# Patient Record
Sex: FEMALE | Race: OTHER | NOT HISPANIC OR LATINO | URBAN - METROPOLITAN AREA
[De-identification: names, ages, dates, MRNs, and addresses within clinical notes are randomized per-mention and may not be internally consistent; named-entity substitution may affect disease eponyms.]

---

## 2017-01-22 ENCOUNTER — EMERGENCY (EMERGENCY)
Facility: HOSPITAL | Age: 32
LOS: 1 days | Discharge: HOME | End: 2017-01-22
Attending: EMERGENCY MEDICINE
Payer: MEDICAID

## 2017-01-22 VITALS
SYSTOLIC BLOOD PRESSURE: 140 MMHG | RESPIRATION RATE: 20 BRPM | OXYGEN SATURATION: 100 % | HEART RATE: 93 BPM | DIASTOLIC BLOOD PRESSURE: 81 MMHG | WEIGHT: 199.96 LBS | TEMPERATURE: 99 F

## 2017-01-22 DIAGNOSIS — R50.9 FEVER, UNSPECIFIED: ICD-10-CM

## 2017-01-22 DIAGNOSIS — J03.90 ACUTE TONSILLITIS, UNSPECIFIED: ICD-10-CM

## 2017-01-22 PROCEDURE — 99283 EMERGENCY DEPT VISIT LOW MDM: CPT

## 2017-01-22 RX ORDER — PENICILLIN V POTASSIUM 250 MG
500 TABLET ORAL ONCE
Qty: 0 | Refills: 0 | Status: COMPLETED | OUTPATIENT
Start: 2017-01-22 | End: 2017-01-22

## 2017-01-22 RX ORDER — PENICILLIN V POTASSIUM 250 MG
1 TABLET ORAL
Qty: 30 | Refills: 0
Start: 2017-01-22 | End: 2017-02-01

## 2017-01-22 RX ORDER — ACETAMINOPHEN 500 MG
650 TABLET ORAL ONCE
Qty: 0 | Refills: 0 | Status: COMPLETED | OUTPATIENT
Start: 2017-01-22 | End: 2017-01-22

## 2017-01-22 RX ADMIN — Medication 500 MILLIGRAM(S): at 20:41

## 2017-01-22 RX ADMIN — Medication 650 MILLIGRAM(S): at 20:40

## 2017-01-22 NOTE — ED STATDOCS - OBJECTIVE STATEMENT
30 y/o F pt w/ no significant PMHx presents to the ED c/o subjective fever and body aches onset today morning. Pt states that she woke up w/ her sx, and that her daughter has similar sx. Pt's son has strep pharyngitis. Pt denies cough, vomiting, throat pain, nasal congestion, chance of pregnancy, or any other complaints. NKDA.

## 2017-01-22 NOTE — ED STATDOCS - NS ED MD SCRIBE ATTENDING SCRIBE SECTIONS
PAST MEDICAL/SURGICAL/SOCIAL HISTORY/VITAL SIGNS( Pullset)/HIV/DISPOSITION/REVIEW OF SYSTEMS/PHYSICAL EXAM/HISTORY OF PRESENT ILLNESS

## 2017-01-22 NOTE — ED STATDOCS - MEDICAL DECISION MAKING DETAILS
32 y/o F pt w/ fever. Pt's child dx w/ strep throat by PMD 2 days ago, mothers other child sick w/ fever and slight watery eyes. Will tx family for probable strep.

## 2017-01-22 NOTE — ED STATDOCS - DETAILS:
I, Dana Maurer, personally performed the services described in the documentation, reviewed the documentation recorded by the scribe in my presence and it accurately and completely records my words and action.

## 2017-03-05 ENCOUNTER — TRANSCRIPTION ENCOUNTER (OUTPATIENT)
Age: 32
End: 2017-03-05

## 2017-05-11 ENCOUNTER — APPOINTMENT (OUTPATIENT)
Dept: PLASTIC SURGERY | Facility: CLINIC | Age: 32
End: 2017-05-11

## 2018-12-12 ENCOUNTER — TRANSCRIPTION ENCOUNTER (OUTPATIENT)
Age: 33
End: 2018-12-12

## 2019-10-30 ENCOUNTER — OUTPATIENT (OUTPATIENT)
Dept: OUTPATIENT SERVICES | Facility: HOSPITAL | Age: 34
LOS: 1 days | End: 2019-10-30

## 2019-10-30 VITALS
DIASTOLIC BLOOD PRESSURE: 86 MMHG | HEART RATE: 76 BPM | TEMPERATURE: 98 F | SYSTOLIC BLOOD PRESSURE: 120 MMHG | WEIGHT: 229.28 LBS | RESPIRATION RATE: 20 BRPM | HEIGHT: 64 IN

## 2019-10-30 DIAGNOSIS — Z90.49 ACQUIRED ABSENCE OF OTHER SPECIFIED PARTS OF DIGESTIVE TRACT: Chronic | ICD-10-CM

## 2019-10-30 DIAGNOSIS — Z01.818 ENCOUNTER FOR OTHER PREPROCEDURAL EXAMINATION: ICD-10-CM

## 2019-10-30 DIAGNOSIS — Z98.84 BARIATRIC SURGERY STATUS: Chronic | ICD-10-CM

## 2019-10-30 LAB
APTT BLD: 27.1 SEC — LOW (ref 27.5–36.3)
BASOPHILS # BLD AUTO: 0.03 K/UL — SIGNIFICANT CHANGE UP (ref 0–0.2)
BASOPHILS NFR BLD AUTO: 0.4 % — SIGNIFICANT CHANGE UP (ref 0–2)
BLD GP AB SCN SERPL QL: SIGNIFICANT CHANGE UP
EOSINOPHIL # BLD AUTO: 0.12 K/UL — SIGNIFICANT CHANGE UP (ref 0–0.5)
EOSINOPHIL NFR BLD AUTO: 1.7 % — SIGNIFICANT CHANGE UP (ref 0–6)
HCT VFR BLD CALC: 29.4 % — LOW (ref 34.5–45)
HGB BLD-MCNC: 8.9 G/DL — LOW (ref 11.5–15.5)
HIV 1 & 2 AB SERPL IA.RAPID: SIGNIFICANT CHANGE UP
HIV 1+2 AB+HIV1 P24 AG SERPL QL IA: SIGNIFICANT CHANGE UP
IMM GRANULOCYTES NFR BLD AUTO: 1.1 % — SIGNIFICANT CHANGE UP (ref 0–1.5)
INR BLD: 0.97 RATIO — SIGNIFICANT CHANGE UP (ref 0.88–1.16)
LYMPHOCYTES # BLD AUTO: 2.33 K/UL — SIGNIFICANT CHANGE UP (ref 1–3.3)
LYMPHOCYTES # BLD AUTO: 32.7 % — SIGNIFICANT CHANGE UP (ref 13–44)
MCHC RBC-ENTMCNC: 24.1 PG — LOW (ref 27–34)
MCHC RBC-ENTMCNC: 30.3 GM/DL — LOW (ref 32–36)
MCV RBC AUTO: 79.5 FL — LOW (ref 80–100)
MONOCYTES # BLD AUTO: 0.51 K/UL — SIGNIFICANT CHANGE UP (ref 0–0.9)
MONOCYTES NFR BLD AUTO: 7.2 % — SIGNIFICANT CHANGE UP (ref 2–14)
NEUTROPHILS # BLD AUTO: 4.05 K/UL — SIGNIFICANT CHANGE UP (ref 1.8–7.4)
NEUTROPHILS NFR BLD AUTO: 56.9 % — SIGNIFICANT CHANGE UP (ref 43–77)
PLATELET # BLD AUTO: 257 K/UL — SIGNIFICANT CHANGE UP (ref 150–400)
PROTHROM AB SERPL-ACNC: 11.2 SEC — SIGNIFICANT CHANGE UP (ref 10–12.9)
RBC # BLD: 3.7 M/UL — LOW (ref 3.8–5.2)
RBC # FLD: 14.6 % — HIGH (ref 10.3–14.5)
WBC # BLD: 7.12 K/UL — SIGNIFICANT CHANGE UP (ref 3.8–10.5)
WBC # FLD AUTO: 7.12 K/UL — SIGNIFICANT CHANGE UP (ref 3.8–10.5)

## 2019-10-30 NOTE — OB PST NOTE - PMH
Acute gastric ulcer, unspecified whether gastric ulcer hemorrhage or perforation present  pt states she was vomiting blood 3 days ago and MD prescribed Omeprezole  No pertinent past medical history     (normal spontaneous vaginal delivery)

## 2019-10-30 NOTE — OB PST NOTE - PRO PATERNAL INTER NEED
Med: Methotrexate 2.5mg   Tabs requested: 78  Si/week  Refills Requested: 2    Last time filled: 2017 Pt wants 3 month supply  Last office Visit: 17  Dx: rheumatoid arthritis and senile osteoporosis  F/u: RTC 1 year   last lab: 17    refillwithin protocol  Script e-scribed to patients preferred pharmacy.         English

## 2019-11-07 ENCOUNTER — RESULT REVIEW (OUTPATIENT)
Age: 34
End: 2019-11-07

## 2019-11-07 ENCOUNTER — INPATIENT (INPATIENT)
Facility: HOSPITAL | Age: 34
LOS: 2 days | Discharge: HOME | End: 2019-11-10
Attending: OBSTETRICS & GYNECOLOGY | Admitting: OBSTETRICS & GYNECOLOGY
Payer: MEDICAID

## 2019-11-07 VITALS
RESPIRATION RATE: 16 BRPM | DIASTOLIC BLOOD PRESSURE: 60 MMHG | HEIGHT: 64 IN | SYSTOLIC BLOOD PRESSURE: 113 MMHG | TEMPERATURE: 98 F | WEIGHT: 229.28 LBS | HEART RATE: 74 BPM

## 2019-11-07 DIAGNOSIS — Z98.84 BARIATRIC SURGERY STATUS: Chronic | ICD-10-CM

## 2019-11-07 DIAGNOSIS — O34.219 MATERNAL CARE FOR UNSPECIFIED TYPE SCAR FROM PREVIOUS CESAREAN DELIVERY: ICD-10-CM

## 2019-11-07 DIAGNOSIS — Z90.49 ACQUIRED ABSENCE OF OTHER SPECIFIED PARTS OF DIGESTIVE TRACT: Chronic | ICD-10-CM

## 2019-11-07 LAB
APPEARANCE UR: CLEAR — SIGNIFICANT CHANGE UP
BACTERIA # UR AUTO: ABNORMAL
BASOPHILS # BLD AUTO: 0.04 K/UL — SIGNIFICANT CHANGE UP (ref 0–0.2)
BASOPHILS NFR BLD AUTO: 0.5 % — SIGNIFICANT CHANGE UP (ref 0–2)
BILIRUB UR-MCNC: NEGATIVE — SIGNIFICANT CHANGE UP
BLD GP AB SCN SERPL QL: SIGNIFICANT CHANGE UP
COLOR SPEC: YELLOW — SIGNIFICANT CHANGE UP
COMMENT - URINE: SIGNIFICANT CHANGE UP
DIFF PNL FLD: NEGATIVE — SIGNIFICANT CHANGE UP
EOSINOPHIL # BLD AUTO: 0.1 K/UL — SIGNIFICANT CHANGE UP (ref 0–0.5)
EOSINOPHIL NFR BLD AUTO: 1.1 % — SIGNIFICANT CHANGE UP (ref 0–6)
EPI CELLS # UR: SIGNIFICANT CHANGE UP
GLUCOSE UR QL: NEGATIVE MG/DL — SIGNIFICANT CHANGE UP
HCT VFR BLD CALC: 32.8 % — LOW (ref 34.5–45)
HGB BLD-MCNC: 10.1 G/DL — LOW (ref 11.5–15.5)
IMM GRANULOCYTES NFR BLD AUTO: 0.9 % — SIGNIFICANT CHANGE UP (ref 0–1.5)
KETONES UR-MCNC: ABNORMAL
LEUKOCYTE ESTERASE UR-ACNC: ABNORMAL
LYMPHOCYTES # BLD AUTO: 2.96 K/UL — SIGNIFICANT CHANGE UP (ref 1–3.3)
LYMPHOCYTES # BLD AUTO: 33.8 % — SIGNIFICANT CHANGE UP (ref 13–44)
MCHC RBC-ENTMCNC: 24.1 PG — LOW (ref 27–34)
MCHC RBC-ENTMCNC: 30.8 GM/DL — LOW (ref 32–36)
MCV RBC AUTO: 78.3 FL — LOW (ref 80–100)
MONOCYTES # BLD AUTO: 0.6 K/UL — SIGNIFICANT CHANGE UP (ref 0–0.9)
MONOCYTES NFR BLD AUTO: 6.9 % — SIGNIFICANT CHANGE UP (ref 2–14)
NEUTROPHILS # BLD AUTO: 4.97 K/UL — SIGNIFICANT CHANGE UP (ref 1.8–7.4)
NEUTROPHILS NFR BLD AUTO: 56.8 % — SIGNIFICANT CHANGE UP (ref 43–77)
NITRITE UR-MCNC: NEGATIVE — SIGNIFICANT CHANGE UP
PH UR: 7 — SIGNIFICANT CHANGE UP (ref 5–8)
PLATELET # BLD AUTO: 291 K/UL — SIGNIFICANT CHANGE UP (ref 150–400)
PROT UR-MCNC: NEGATIVE MG/DL — SIGNIFICANT CHANGE UP
RBC # BLD: 4.19 M/UL — SIGNIFICANT CHANGE UP (ref 3.8–5.2)
RBC # FLD: 15.1 % — HIGH (ref 10.3–14.5)
RBC CASTS # UR COMP ASSIST: SIGNIFICANT CHANGE UP /HPF (ref 0–4)
SP GR SPEC: 1.01 — SIGNIFICANT CHANGE UP (ref 1.01–1.02)
T PALLIDUM AB TITR SER: NEGATIVE — SIGNIFICANT CHANGE UP
UROBILINOGEN FLD QL: NEGATIVE MG/DL — SIGNIFICANT CHANGE UP
WBC # BLD: 8.75 K/UL — SIGNIFICANT CHANGE UP (ref 3.8–10.5)
WBC # FLD AUTO: 8.75 K/UL — SIGNIFICANT CHANGE UP (ref 3.8–10.5)
WBC UR QL: SIGNIFICANT CHANGE UP

## 2019-11-07 PROCEDURE — 88302 TISSUE EXAM BY PATHOLOGIST: CPT | Mod: 26

## 2019-11-07 RX ORDER — ACETAMINOPHEN 500 MG
975 TABLET ORAL
Refills: 0 | Status: DISCONTINUED | OUTPATIENT
Start: 2019-11-07 | End: 2019-11-10

## 2019-11-07 RX ORDER — FENTANYL CITRATE 50 UG/ML
25 INJECTION INTRAVENOUS
Refills: 0 | Status: DISCONTINUED | OUTPATIENT
Start: 2019-11-07 | End: 2019-11-07

## 2019-11-07 RX ORDER — SODIUM CHLORIDE 9 MG/ML
1000 INJECTION, SOLUTION INTRAVENOUS
Refills: 0 | Status: DISCONTINUED | OUTPATIENT
Start: 2019-11-07 | End: 2019-11-07

## 2019-11-07 RX ORDER — AMPICILLIN TRIHYDRATE 250 MG
1 CAPSULE ORAL EVERY 4 HOURS
Refills: 0 | Status: DISCONTINUED | OUTPATIENT
Start: 2019-11-07 | End: 2019-11-08

## 2019-11-07 RX ORDER — OXYTOCIN 10 UNIT/ML
333.33 VIAL (ML) INJECTION
Qty: 20 | Refills: 0 | Status: DISCONTINUED | OUTPATIENT
Start: 2019-11-07 | End: 2019-11-10

## 2019-11-07 RX ORDER — METOCLOPRAMIDE HCL 10 MG
10 TABLET ORAL ONCE
Refills: 0 | Status: DISCONTINUED | OUTPATIENT
Start: 2019-11-07 | End: 2019-11-07

## 2019-11-07 RX ORDER — GLYCERIN ADULT
1 SUPPOSITORY, RECTAL RECTAL AT BEDTIME
Refills: 0 | Status: DISCONTINUED | OUTPATIENT
Start: 2019-11-07 | End: 2019-11-10

## 2019-11-07 RX ORDER — ACETAMINOPHEN 500 MG
1000 TABLET ORAL ONCE
Refills: 0 | Status: DISCONTINUED | OUTPATIENT
Start: 2019-11-07 | End: 2019-11-07

## 2019-11-07 RX ORDER — TETANUS TOXOID, REDUCED DIPHTHERIA TOXOID AND ACELLULAR PERTUSSIS VACCINE, ADSORBED 5; 2.5; 8; 8; 2.5 [IU]/.5ML; [IU]/.5ML; UG/.5ML; UG/.5ML; UG/.5ML
0.5 SUSPENSION INTRAMUSCULAR ONCE
Refills: 0 | Status: DISCONTINUED | OUTPATIENT
Start: 2019-11-07 | End: 2019-11-10

## 2019-11-07 RX ORDER — IBUPROFEN 200 MG
600 TABLET ORAL EVERY 6 HOURS
Refills: 0 | Status: COMPLETED | OUTPATIENT
Start: 2019-11-07 | End: 2020-10-05

## 2019-11-07 RX ORDER — DIPHENHYDRAMINE HCL 50 MG
25 CAPSULE ORAL EVERY 6 HOURS
Refills: 0 | Status: DISCONTINUED | OUTPATIENT
Start: 2019-11-07 | End: 2019-11-10

## 2019-11-07 RX ORDER — KETOROLAC TROMETHAMINE 30 MG/ML
30 SYRINGE (ML) INJECTION ONCE
Refills: 0 | Status: DISCONTINUED | OUTPATIENT
Start: 2019-11-07 | End: 2019-11-07

## 2019-11-07 RX ORDER — MAGNESIUM HYDROXIDE 400 MG/1
30 TABLET, CHEWABLE ORAL
Refills: 0 | Status: DISCONTINUED | OUTPATIENT
Start: 2019-11-07 | End: 2019-11-10

## 2019-11-07 RX ORDER — OXYTOCIN 10 UNIT/ML
333.33 VIAL (ML) INJECTION
Qty: 20 | Refills: 0 | Status: DISCONTINUED | OUTPATIENT
Start: 2019-11-07 | End: 2019-11-07

## 2019-11-07 RX ORDER — CITRIC ACID/SODIUM CITRATE 300-500 MG
30 SOLUTION, ORAL ORAL ONCE
Refills: 0 | Status: COMPLETED | OUTPATIENT
Start: 2019-11-07 | End: 2019-11-07

## 2019-11-07 RX ORDER — AMPICILLIN TRIHYDRATE 250 MG
2 CAPSULE ORAL ONCE
Refills: 0 | Status: COMPLETED | OUTPATIENT
Start: 2019-11-07 | End: 2019-11-07

## 2019-11-07 RX ORDER — CEFAZOLIN SODIUM 1 G
2000 VIAL (EA) INJECTION ONCE
Refills: 0 | Status: COMPLETED | OUTPATIENT
Start: 2019-11-07 | End: 2019-11-07

## 2019-11-07 RX ORDER — SODIUM CHLORIDE 9 MG/ML
1000 INJECTION, SOLUTION INTRAVENOUS
Refills: 0 | Status: DISCONTINUED | OUTPATIENT
Start: 2019-11-07 | End: 2019-11-10

## 2019-11-07 RX ORDER — LANOLIN
1 OINTMENT (GRAM) TOPICAL EVERY 6 HOURS
Refills: 0 | Status: DISCONTINUED | OUTPATIENT
Start: 2019-11-07 | End: 2019-11-10

## 2019-11-07 RX ORDER — DEXAMETHASONE 0.5 MG/5ML
8 ELIXIR ORAL ONCE
Refills: 0 | Status: DISCONTINUED | OUTPATIENT
Start: 2019-11-07 | End: 2019-11-07

## 2019-11-07 RX ORDER — ONDANSETRON 8 MG/1
4 TABLET, FILM COATED ORAL ONCE
Refills: 0 | Status: DISCONTINUED | OUTPATIENT
Start: 2019-11-07 | End: 2019-11-07

## 2019-11-07 RX ORDER — SODIUM CHLORIDE 9 MG/ML
1000 INJECTION, SOLUTION INTRAVENOUS ONCE
Refills: 0 | Status: COMPLETED | OUTPATIENT
Start: 2019-11-07 | End: 2019-11-07

## 2019-11-07 RX ORDER — FAMOTIDINE 10 MG/ML
20 INJECTION INTRAVENOUS ONCE
Refills: 0 | Status: COMPLETED | OUTPATIENT
Start: 2019-11-07 | End: 2019-11-07

## 2019-11-07 RX ORDER — SIMETHICONE 80 MG/1
80 TABLET, CHEWABLE ORAL EVERY 4 HOURS
Refills: 0 | Status: DISCONTINUED | OUTPATIENT
Start: 2019-11-07 | End: 2019-11-10

## 2019-11-07 RX ORDER — KETOROLAC TROMETHAMINE 30 MG/ML
30 SYRINGE (ML) INJECTION EVERY 6 HOURS
Refills: 0 | Status: DISCONTINUED | OUTPATIENT
Start: 2019-11-07 | End: 2019-11-08

## 2019-11-07 RX ADMIN — Medication 975 MILLIGRAM(S): at 21:42

## 2019-11-07 RX ADMIN — Medication 30 MILLIGRAM(S): at 11:58

## 2019-11-07 RX ADMIN — Medication 30 MILLILITER(S): at 06:43

## 2019-11-07 RX ADMIN — Medication 30 MILLIGRAM(S): at 11:43

## 2019-11-07 RX ADMIN — Medication 975 MILLIGRAM(S): at 22:42

## 2019-11-07 RX ADMIN — Medication 1000 MILLIUNIT(S)/MIN: at 08:25

## 2019-11-07 RX ADMIN — FAMOTIDINE 20 MILLIGRAM(S): 10 INJECTION INTRAVENOUS at 06:43

## 2019-11-07 RX ADMIN — SODIUM CHLORIDE 125 MILLILITER(S): 9 INJECTION, SOLUTION INTRAVENOUS at 06:47

## 2019-11-07 RX ADMIN — Medication 100 MILLIGRAM(S): at 08:04

## 2019-11-07 RX ADMIN — SODIUM CHLORIDE 125 MILLILITER(S): 9 INJECTION, SOLUTION INTRAVENOUS at 12:12

## 2019-11-07 RX ADMIN — SODIUM CHLORIDE 2000 MILLILITER(S): 9 INJECTION, SOLUTION INTRAVENOUS at 06:20

## 2019-11-07 NOTE — OB PROVIDER H&P - ASSESSMENT
Patient is a 33 yo  at 39w c/w LMP who presents to L&D for a repeat c/s and BTL. No acute complaints at this time.    PLAN:  - Reactive tracing  - Admit to L&D  - Consent  - Admission labs  - GBS positive  - Allergy to oxycodone and morphine  - Pre-op antibiotics ordered  - Plan for rCS and BTL today

## 2019-11-07 NOTE — OB PROVIDER H&P - NSHPPHYSICALEXAM_GEN_ALL_CORE
ICU Vital Signs Last 24 Hrs  T(C): 36.9 (07 Nov 2019 06:16), Max: 36.9 (07 Nov 2019 06:13)  T(F): 98.42 (07 Nov 2019 06:16), Max: 98.42 (07 Nov 2019 06:16)  HR: 74 (07 Nov 2019 06:26) (74 - 74)  BP: 113/60 (07 Nov 2019 06:26) (113/60 - 113/60)  RR: 16 (07 Nov 2019 06:16) (16 - 16)      General: AOx3, NAD  Abd: Soft, nontender, gravid  SVE: Deferred      BMI (kg/m2): 38.4 (11-07-19 @ 06:13)        FHT: ***bpm - category I tracing.  Azle: Ctxs every *** minutes.     Sono: Vertex ICU Vital Signs Last 24 Hrs  T(C): 36.9 (07 Nov 2019 06:16), Max: 36.9 (07 Nov 2019 06:13)  T(F): 98.42 (07 Nov 2019 06:16), Max: 98.42 (07 Nov 2019 06:16)  HR: 74 (07 Nov 2019 06:26) (74 - 74)  BP: 113/60 (07 Nov 2019 06:26) (113/60 - 113/60)  RR: 16 (07 Nov 2019 06:16) (16 - 16)      General: AOx3, NAD  Abd: Soft, nontender, gravid  SVE: Deferred      BMI (kg/m2): 38.4 (11-07-19 @ 06:13)        FHT: 130 bpm, moderate variability + accels no decelerations present   Mitchell: Uterine irritability     Sono: Vertex

## 2019-11-07 NOTE — OB RN DELIVERY SUMMARY - NS_SEPSISRSKCALC_OBGYN_ALL_OB_FT
EOS calculated successfully. EOS Risk Factor: 0.5/1000 live births ([facility] national incidence); GA=39w;Temp=98.42; ROM=0; GBS='Positive'; Antibiotics='No antibiotics or any antibiotics < 2 hrs prior to birth'

## 2019-11-07 NOTE — OB PROVIDER H&P - HISTORY OF PRESENT ILLNESS
Patient is a 33 yo  at 39w c/w LMP who presents to L&D for a repeat c/s and BTL.    No acute complaints at this time. Patient denies any vaginal bleeding, LOF, contractions or dysuria. Reports good fetal movement.    IDA: 2019  LMP: 2019    Prenatal course complicated by:  1)	Gastric ulcer Oct 2019, patient reports numerous episodes of hematemesis and states that she ws started on omeprazole recently  2)	Obesity    OBHx:   - G1 () -  @ 41 wks - Male - 7lbs 8 oz  - G2 ()- pCS @ 39 wks due to NRFHT 2/2 tight nuchal - Female - 6lbs 4 oz  - G3 () - eTOP w/ D&C    PMH: gastric ulcer   PSH: gastric bypass , cholecystectomy , C/S x 1 ()   Meds: PNV, Omeprazole 20mg, Zantac 300  ALL: Oxycodone (vomiting), Morphine (vomiting)    GBS: Pos  HIV: NR  RPR: Neg  Rubella: Immune  HepB: Neg  ABO: B pos

## 2019-11-07 NOTE — OB NEONATOLOGY/PEDIATRICIAN DELIVERY SUMMARY - NSPEDSNEONOTESA_OBGYN_ALL_OB_FT
Dr. Santoyo requested me to attend R C/S at 39 weeks. The mother is 33 y/o, , B+, HIV NR, RPR NR, HBsAg NR, R-NI, GBS +  L & D: Clear fluid, suctioned and dried  Asst: full term appropriate for gestational age, BB, R C/S  Plan: Observe in transition, if stable admit to well baby nursery.

## 2019-11-07 NOTE — OB PROVIDER DELIVERY SUMMARY - NS_ADMITLABOR_OBGYN_ALL_OB
Spinal Block    Patient location during procedure: OR  Start time: 1/22/2018 12:09 PM  End time: 1/22/2018 12:10 PM  Reason for block: procedure for pain, at surgeon's request and primary anesthetic  Staffing  Resident/CRNA: Stephanie Gabriel  Performed: resident/CRNA   Preanesthetic Checklist  Completed: patient identified, site marked, surgical consent, pre-op evaluation, timeout performed, IV checked, risks and benefits discussed and monitors and equipment checked  Spinal Block  Patient position: sitting  Prep: ChloraPrep  Patient monitoring: cardiac monitor and frequent blood pressure checks  Approach: midline  Location: L3-4  Injection technique: single-shot  Needle  Needle type: pencil-tip   Needle gauge: 25 G  Needle length: 10 cm  Assessment  Sensory level: [id number] Assessment:  negative aspiration for heme, no paresthesia on injection and positive aspiration for clear CSF    Post-procedure:  adhesive bandage applied, pressure dressing applied, secured with tape, site cleaned and sterile dressing applied No

## 2019-11-07 NOTE — OB PROVIDER DELIVERY SUMMARY - NSPROVIDERDELIVERYNOTE_OBGYN_ALL_OB_FT
34yyo  @ 39 wks presenting with repeat  section and BTL.     Patient was taken to the OR, a repeat low transverse  section was performed and a viable male infant was delivered atraumatically in cephalic presentation at 08:25 nuchal cord x0. Placenta delivered intact. Hysterotomy repaired with excellent hemostasis noted. Left fallopian tube grasped with babock and removed with ligasure device. Similar procedure was repeated with right fallopian tube.  Rectus muscles, fascia, and skin were reapproximated with excellent hemostasis noted at each layer    Apgars 9/9  EBL 700cc 35yo  @ 39 wks presenting with repeat  section and BTL.     Patient was taken to the OR, a repeat low transverse  section was performed and a viable male infant was delivered atraumatically in cephalic presentation at 08:25 nuchal cord x0. Placenta delivered intact. Hysterotomy repaired with excellent hemostasis noted. Left fallopian tube grasped with babock and removed with ligasure device. Similar procedure was repeated with right fallopian tube.  Rectus muscles, fascia, and skin were reapproximated with excellent hemostasis noted at each layer    Apgars 9/9  EBL 700cc 35yo  @ 39 wks presenting with repeat  section and BTL.     Patient was taken to the OR, a repeat low transverse  section was performed and a viable male infant was delivered atraumatically in cephalic presentation at 08:25 nuchal cord x0. Placenta delivered intact. Hysterotomy repaired with excellent hemostasis noted. Left fallopian tube grasped with isaac and removed with ligasure device. Similar procedure was repeated with right fallopian tube.  Rectus muscles, fascia, and skin were reapproximated with excellent hemostasis noted at each layer    Apgars 9/9  EBL 700cc

## 2019-11-08 ENCOUNTER — TRANSCRIPTION ENCOUNTER (OUTPATIENT)
Age: 34
End: 2019-11-08

## 2019-11-08 LAB
BASOPHILS # BLD AUTO: 0.02 K/UL — SIGNIFICANT CHANGE UP (ref 0–0.2)
BASOPHILS NFR BLD AUTO: 0.3 % — SIGNIFICANT CHANGE UP (ref 0–2)
EOSINOPHIL # BLD AUTO: 0.09 K/UL — SIGNIFICANT CHANGE UP (ref 0–0.5)
EOSINOPHIL NFR BLD AUTO: 1.1 % — SIGNIFICANT CHANGE UP (ref 0–6)
HCT VFR BLD CALC: 27 % — LOW (ref 34.5–45)
HGB BLD-MCNC: 8.1 G/DL — LOW (ref 11.5–15.5)
IMM GRANULOCYTES NFR BLD AUTO: 0.6 % — SIGNIFICANT CHANGE UP (ref 0–1.5)
LYMPHOCYTES # BLD AUTO: 2.38 K/UL — SIGNIFICANT CHANGE UP (ref 1–3.3)
LYMPHOCYTES # BLD AUTO: 30.2 % — SIGNIFICANT CHANGE UP (ref 13–44)
MCHC RBC-ENTMCNC: 23.9 PG — LOW (ref 27–34)
MCHC RBC-ENTMCNC: 30 GM/DL — LOW (ref 32–36)
MCV RBC AUTO: 79.6 FL — LOW (ref 80–100)
MONOCYTES # BLD AUTO: 0.59 K/UL — SIGNIFICANT CHANGE UP (ref 0–0.9)
MONOCYTES NFR BLD AUTO: 7.5 % — SIGNIFICANT CHANGE UP (ref 2–14)
NEUTROPHILS # BLD AUTO: 4.76 K/UL — SIGNIFICANT CHANGE UP (ref 1.8–7.4)
NEUTROPHILS NFR BLD AUTO: 60.3 % — SIGNIFICANT CHANGE UP (ref 43–77)
PLATELET # BLD AUTO: 221 K/UL — SIGNIFICANT CHANGE UP (ref 150–400)
RBC # BLD: 3.39 M/UL — LOW (ref 3.8–5.2)
RBC # FLD: 15 % — HIGH (ref 10.3–14.5)
WBC # BLD: 7.89 K/UL — SIGNIFICANT CHANGE UP (ref 3.8–10.5)
WBC # FLD AUTO: 7.89 K/UL — SIGNIFICANT CHANGE UP (ref 3.8–10.5)

## 2019-11-08 RX ORDER — POLYETHYLENE GLYCOL 3350 17 G/17G
17 POWDER, FOR SOLUTION ORAL ONCE
Refills: 0 | Status: COMPLETED | OUTPATIENT
Start: 2019-11-08 | End: 2019-11-08

## 2019-11-08 RX ORDER — ASCORBIC ACID 60 MG
500 TABLET,CHEWABLE ORAL THREE TIMES A DAY
Refills: 0 | Status: DISCONTINUED | OUTPATIENT
Start: 2019-11-08 | End: 2019-11-10

## 2019-11-08 RX ORDER — FAMOTIDINE 10 MG/ML
40 INJECTION INTRAVENOUS AT BEDTIME
Refills: 0 | Status: COMPLETED | OUTPATIENT
Start: 2019-11-08 | End: 2019-11-09

## 2019-11-08 RX ORDER — PANTOPRAZOLE SODIUM 20 MG/1
40 TABLET, DELAYED RELEASE ORAL
Refills: 0 | Status: DISCONTINUED | OUTPATIENT
Start: 2019-11-08 | End: 2019-11-10

## 2019-11-08 RX ORDER — FERROUS SULFATE 325(65) MG
325 TABLET ORAL THREE TIMES A DAY
Refills: 0 | Status: DISCONTINUED | OUTPATIENT
Start: 2019-11-08 | End: 2019-11-10

## 2019-11-08 RX ORDER — IBUPROFEN 200 MG
600 TABLET ORAL EVERY 6 HOURS
Refills: 0 | Status: DISCONTINUED | OUTPATIENT
Start: 2019-11-08 | End: 2019-11-10

## 2019-11-08 RX ADMIN — Medication 325 MILLIGRAM(S): at 13:53

## 2019-11-08 RX ADMIN — Medication 600 MILLIGRAM(S): at 16:30

## 2019-11-08 RX ADMIN — Medication 600 MILLIGRAM(S): at 23:42

## 2019-11-08 RX ADMIN — Medication 975 MILLIGRAM(S): at 09:44

## 2019-11-08 RX ADMIN — Medication 975 MILLIGRAM(S): at 04:00

## 2019-11-08 RX ADMIN — Medication 975 MILLIGRAM(S): at 21:18

## 2019-11-08 RX ADMIN — SIMETHICONE 80 MILLIGRAM(S): 80 TABLET, CHEWABLE ORAL at 09:16

## 2019-11-08 RX ADMIN — FAMOTIDINE 40 MILLIGRAM(S): 10 INJECTION INTRAVENOUS at 20:47

## 2019-11-08 RX ADMIN — Medication 975 MILLIGRAM(S): at 20:48

## 2019-11-08 RX ADMIN — Medication 600 MILLIGRAM(S): at 17:00

## 2019-11-08 RX ADMIN — SIMETHICONE 80 MILLIGRAM(S): 80 TABLET, CHEWABLE ORAL at 16:25

## 2019-11-08 RX ADMIN — POLYETHYLENE GLYCOL 3350 17 GRAM(S): 17 POWDER, FOR SOLUTION ORAL at 13:54

## 2019-11-08 RX ADMIN — Medication 975 MILLIGRAM(S): at 09:14

## 2019-11-08 RX ADMIN — PANTOPRAZOLE SODIUM 40 MILLIGRAM(S): 20 TABLET, DELAYED RELEASE ORAL at 13:53

## 2019-11-08 RX ADMIN — Medication 500 MILLIGRAM(S): at 13:53

## 2019-11-08 RX ADMIN — Medication 975 MILLIGRAM(S): at 03:00

## 2019-11-08 NOTE — DISCHARGE NOTE OB - PATIENT PORTAL LINK FT
You can access the FollowMyHealth Patient Portal offered by [facility] by registering at the following website: [email or web address]. By joining [facility] FollowMyHealth portal, you will also be able to view your health information using other applications (apps) compatible with our system.

## 2019-11-08 NOTE — PROGRESS NOTE ADULT - SUBJECTIVE AND OBJECTIVE BOX
KEYONNA LOYA is a 35 yo  now POD#1 s/p repeat  section @ 39wks and bilateral salpingectomy. Male infant    S:    Patient seen and examined at bedside this AM.   She is doing well, has no complaints.   Reports moderate abdominal pain around surgical incision site.  She is ambulating without difficulty and tolerating PO.   She is urinating spontaneously  - flatus/-BM     O:    T(C): 36.5 (19 @ 04:05), Max: 37.1 (19 @ 15:35)  HR: 72 (19 @ 04:05) (68 - 89)  BP: 122/79 (19 @ 04:05) (102/57 - 129/80)  RR: 18 (19 @ 04:05) (14 - 20)  SpO2: 96% (19 @ 04:05) (96% - 100%)    PHYSICAL EXAM:  Breast: Nontender, not engorged   Abdomen:  Soft, appropriately-tender, non-distended, +bowel sounds.  Uterus:  Fundus firm below umbilicus  Incision:  Dermabond in place. Incision Clean/dry/intact  VE:  +lochia  Ext:  Non-tender, no edema                           10.1   8.75  )-----------( 291      ( 2019 06:53 )             32.8

## 2019-11-08 NOTE — DISCHARGE NOTE OB - MEDICATION SUMMARY - MEDICATIONS TO STOP TAKING
I will STOP taking the medications listed below when I get home from the hospital:    ofloxacin 0.3% otic solution  -- 10 drop(s) in the right ear once a day x 7 days.  -- For the ear.    hydrocortisone/neomycin/polymyxin B otic  -- 5  drops in ear 3 times a day for ten days    acetaminophen-oxyCODONE 325 mg-5 mg oral tablet  -- 1 tab(s) by mouth every 6 hours  -- Caution federal law prohibits the transfer of this drug to any person other  than the person for whom it was prescribed.  May cause drowsiness.  Alcohol may intensify this effect.  Use care when operating dangerous machinery.  This prescription cannot be refilled.  This product contains acetaminophen.  Do not use  with any other product containing acetaminophen to prevent possible liver damage.  Using more of this medication than prescribed may cause serious breathing problems.    penicillin V potassium 500 mg oral tablet  -- 1 tab(s) by mouth 3 times a day  -- Finish all this medication unless otherwise directed by prescriber.  Take medication on an empty stomach 1 hour before or 2 to 3 hours after a meal unless otherwise directed by your doctor.

## 2019-11-08 NOTE — DISCHARGE NOTE OB - MATERIALS PROVIDED
Shaken Baby Prevention Handout/Breastfeeding Guide and Packet/Birth Certificate Instructions/Tdap Vaccination (VIS Pub Date: 2012)/[facility] [place] Screening Program/Breastfeeding Log/Breastfeeding Mother’s Support Group Information/Guide to Postpartum Care/Back To Sleep Handout Guide to Postpartum Care/Breastfeeding Mother’s Support Group Information/Breastfeeding Guide and Packet/Breastfeeding Log/Shaken Baby Prevention Handout/[facility]  Screening Program/Back To Sleep Handout/MMR Vaccination (VIS Pub Date: 2012)/Tdap Vaccination (VIS Pub Date: 2012)/Birth Certificate Instructions

## 2019-11-08 NOTE — DISCHARGE NOTE OB - CARE PLAN
Principal Discharge DX:	 delivery delivered  Goal:	Rapid Recovery  Assessment and plan of treatment:	Please call your provider to schedule postoperative wound check visit in 2 weeks. Take medications as directed, regular diet, activity as tolerated. Exclusive breast feeding for the first 6 months is recommended. Nothing per vagina for 6 weeks (incl. sex, douching, etc). If you have additional concerns, please inform your provider.

## 2019-11-08 NOTE — PROGRESS NOTE ADULT - PROBLEM SELECTOR PLAN 1
-Recovering well   - F/U AM Labs; Pre-op Hgb: 10.1  - Voiding, tolerating PO   -Advance care as tolerated   -Continue routine postpartum/postoperative care and education.  -DVT PPX: SCDs in place

## 2019-11-08 NOTE — PROGRESS NOTE ADULT - ASSESSMENT
KEYONNA LOYA is a 33 yo  now POD#1 s/p repeat  section @ 39wks and bilateral salpingectomy. Male infant. No acute overnight events

## 2019-11-08 NOTE — DISCHARGE NOTE OB - CARE PROVIDER_API CALL
Mekhi Santoyo (DO)  Obstetrics and Gynecology  [address] [place] [address]  Phone: [phone]  Fax: [phone]  Follow Up Time:

## 2019-11-08 NOTE — DISCHARGE NOTE OB - PLAN OF CARE
Rapid Recovery Please call your provider to schedule postoperative wound check visit in 2 weeks. Take medications as directed, regular diet, activity as tolerated. Exclusive breast feeding for the first 6 months is recommended. Nothing per vagina for 6 weeks (incl. sex, douching, etc). If you have additional concerns, please inform your provider.

## 2019-11-08 NOTE — DISCHARGE NOTE OB - MEDICATION SUMMARY - MEDICATIONS TO TAKE
I will START or STAY ON the medications listed below when I get home from the hospital:    ibuprofen 600 mg oral tablet  -- 1 tab(s) by mouth every 6 hours, As Needed -for moderate pain   -- Indication: For  delivery delivered    acetaminophen 325 mg oral tablet  -- 3 tab(s) by mouth every 6 hours, As Needed -for moderate pain   -- Indication: For  delivery delivered    Tomasz-Sequels (as elemental iron) 65 mg-25 mg oral tablet  -- 1 tab(s) by mouth 2 times a day   -- Check with your doctor before becoming pregnant.  Do not take dairy products, antacids, or iron preparations within one hour of this medication.  May discolor urine or feces.  Obtain medical advice before taking any non-prescription drugs as some may affect the action of this medication.    -- Indication: For  delivery delivered

## 2019-11-09 ENCOUNTER — TRANSCRIPTION ENCOUNTER (OUTPATIENT)
Age: 34
End: 2019-11-09

## 2019-11-09 DIAGNOSIS — K25.3 ACUTE GASTRIC ULCER WITHOUT HEMORRHAGE OR PERFORATION: ICD-10-CM

## 2019-11-09 RX ORDER — OMEPRAZOLE 10 MG/1
1 CAPSULE, DELAYED RELEASE ORAL
Qty: 0 | Refills: 0 | DISCHARGE

## 2019-11-09 RX ORDER — ACETAMINOPHEN 500 MG
3 TABLET ORAL
Qty: 84 | Refills: 0
Start: 2019-11-09 | End: 2019-11-15

## 2019-11-09 RX ORDER — IBUPROFEN 200 MG
1 TABLET ORAL
Qty: 28 | Refills: 0
Start: 2019-11-09 | End: 2019-11-15

## 2019-11-09 RX ORDER — RANITIDINE HYDROCHLORIDE 150 MG/1
1 TABLET, FILM COATED ORAL
Qty: 0 | Refills: 0 | DISCHARGE

## 2019-11-09 RX ADMIN — Medication 600 MILLIGRAM(S): at 00:12

## 2019-11-09 RX ADMIN — Medication 975 MILLIGRAM(S): at 08:28

## 2019-11-09 RX ADMIN — Medication 975 MILLIGRAM(S): at 15:35

## 2019-11-09 RX ADMIN — Medication 975 MILLIGRAM(S): at 22:08

## 2019-11-09 RX ADMIN — Medication 600 MILLIGRAM(S): at 06:34

## 2019-11-09 RX ADMIN — Medication 600 MILLIGRAM(S): at 18:54

## 2019-11-09 RX ADMIN — FAMOTIDINE 40 MILLIGRAM(S): 10 INJECTION INTRAVENOUS at 21:38

## 2019-11-09 RX ADMIN — Medication 600 MILLIGRAM(S): at 06:04

## 2019-11-09 RX ADMIN — Medication 600 MILLIGRAM(S): at 13:15

## 2019-11-09 RX ADMIN — Medication 975 MILLIGRAM(S): at 15:05

## 2019-11-09 RX ADMIN — Medication 975 MILLIGRAM(S): at 21:38

## 2019-11-09 RX ADMIN — PANTOPRAZOLE SODIUM 40 MILLIGRAM(S): 20 TABLET, DELAYED RELEASE ORAL at 08:28

## 2019-11-09 RX ADMIN — Medication 600 MILLIGRAM(S): at 18:24

## 2019-11-09 RX ADMIN — Medication 600 MILLIGRAM(S): at 12:44

## 2019-11-09 RX ADMIN — Medication 975 MILLIGRAM(S): at 08:54

## 2019-11-09 NOTE — PROGRESS NOTE ADULT - ASSESSMENT
KEYONNA LOYA is a 33 yo  now POD#2 s/p repeat  section @ 39wks and bilateral salpingectomy. Male infant. No acute overnight events

## 2019-11-09 NOTE — PROGRESS NOTE ADULT - ATTENDING COMMENTS
Pt doing well, eager for D/C home.  PE WNL, incision C/D/I with staples.  OK for early D/C home.  F/U in office next week.

## 2019-11-09 NOTE — PROGRESS NOTE ADULT - PROBLEM SELECTOR PLAN 1
-Recovering well   - F/U AM Labs; Pre-op Hgb: 10.1 --> 8.1  - Patient started on iron TID; patient states that iron is irritating gastric ulcer. Encouraged patient to eat prior to taking iron  - Voiding, tolerating PO   -Advance care as tolerated   -Continue routine postpartum/postoperative care and education.  -DVT PPX: SCDs in place.   - Patient desires to go home pending attending assessment

## 2019-11-09 NOTE — PROGRESS NOTE ADULT - SUBJECTIVE AND OBJECTIVE BOX
KEYONNA LOYA is a 35 yo  now POD#2 s/p repeat  section @ 39wks and bilateral salpingectomy. Male infant    S:    Patient seen and examined at bedside this AM  She is doing well.  Overnight, patient describes moderate abdominal pain that required oxycodone for pain relief. States that the pain subsequently subsided.   Otherwise, She is ambulating without difficulty and tolerating PO.   + flatus/-BM   She is breastfeeding; baby is latching  No additional complaints at this time.    O:    T(C): 36.8 (19 @ 20:06), Max: 36.8 (19 @ 20:06)  HR: 68 (19 @ 20:06) (68 - 70)  BP: 125/83 (19 @ 20:06) (115/80 - 125/83)  RR: 18 (19 @ 20:06) (18 - 18)  SpO2: 99% (19 @ 20:06) (98% - 99%)      PHYSICAL EXAM:  Breast: Nontender, not engorged   Abdomen:  Soft, appropriately-tender, non-distended, +bowel sounds.  Uterus:  Fundus firm below umbilicus  Incision:  Dermabond in place. Incision Clean/dry/intact  VE:  +lochia  Ext:  Non-tender, no edema                         8.1    7.89  )-----------( 221      ( 2019 06:59 )             27.0 KEYONNA LOYA is a 33 yo  now POD#2 s/p repeat  section @ 39wks and bilateral salpingectomy. Male infant    S:    Patient seen and examined at bedside this AM  She is doing well.  Overnight, patient describes moderate abdominal pain that was relieved with tylenol.   Otherwise, She is ambulating without difficulty and tolerating PO.   + flatus/-BM   She is breastfeeding; baby is latching  No additional complaints at this time.    O:    T(C): 36.8 (19 @ 20:06), Max: 36.8 (19 @ 20:06)  HR: 68 (19 @ 20:06) (68 - 70)  BP: 125/83 (19 @ 20:06) (115/80 - 125/83)  RR: 18 (19 @ 20:06) (18 - 18)  SpO2: 99% (19 @ 20:06) (98% - 99%)      PHYSICAL EXAM:  Breast: Nontender, not engorged   Abdomen:  Soft, appropriately-tender, non-distended, +bowel sounds.  Uterus:  Fundus firm below umbilicus  Incision:  Dermabond in place. Incision Clean/dry/intact  VE:  +lochia  Ext:  Non-tender, no edema                         8.1    7.89  )-----------( 221      ( 2019 06:59 )             27.0

## 2019-11-10 VITALS
SYSTOLIC BLOOD PRESSURE: 111 MMHG | TEMPERATURE: 98 F | RESPIRATION RATE: 18 BRPM | HEART RATE: 64 BPM | DIASTOLIC BLOOD PRESSURE: 73 MMHG

## 2019-11-10 PROCEDURE — 88302 TISSUE EXAM BY PATHOLOGIST: CPT

## 2019-11-10 PROCEDURE — 86900 BLOOD TYPING SEROLOGIC ABO: CPT

## 2019-11-10 PROCEDURE — G0463: CPT

## 2019-11-10 PROCEDURE — 59050 FETAL MONITOR W/REPORT: CPT

## 2019-11-10 PROCEDURE — 86901 BLOOD TYPING SEROLOGIC RH(D): CPT

## 2019-11-10 PROCEDURE — 85027 COMPLETE CBC AUTOMATED: CPT

## 2019-11-10 PROCEDURE — 86780 TREPONEMA PALLIDUM: CPT

## 2019-11-10 PROCEDURE — 36415 COLL VENOUS BLD VENIPUNCTURE: CPT

## 2019-11-10 PROCEDURE — 86850 RBC ANTIBODY SCREEN: CPT

## 2019-11-10 PROCEDURE — 81001 URINALYSIS AUTO W/SCOPE: CPT

## 2019-11-10 PROCEDURE — 59025 FETAL NON-STRESS TEST: CPT

## 2019-11-10 RX ADMIN — Medication 975 MILLIGRAM(S): at 03:17

## 2019-11-10 RX ADMIN — Medication 975 MILLIGRAM(S): at 03:43

## 2019-11-10 RX ADMIN — Medication 600 MILLIGRAM(S): at 05:47

## 2019-11-10 RX ADMIN — PANTOPRAZOLE SODIUM 40 MILLIGRAM(S): 20 TABLET, DELAYED RELEASE ORAL at 05:48

## 2019-11-10 RX ADMIN — Medication 975 MILLIGRAM(S): at 09:44

## 2019-11-10 RX ADMIN — Medication 600 MILLIGRAM(S): at 06:14

## 2019-11-10 RX ADMIN — Medication 500 MILLIGRAM(S): at 08:44

## 2019-11-10 RX ADMIN — Medication 975 MILLIGRAM(S): at 08:44

## 2019-11-10 NOTE — PROGRESS NOTE ADULT - ASSESSMENT
A/P: Patient is a 34y  s/p rCS + BS now POD3 -- doing well postpartum  - Stable  - circ today prior to dc  - Hgb 10.1 -> 8.1  - Pain: well controlled on PO pain meds  - GI: regular diet  - : voiding  - DVT ppx: none  - Continue routine postop care  - Dispo: Home today after circ

## 2019-11-10 NOTE — PROGRESS NOTE ADULT - SUBJECTIVE AND OBJECTIVE BOX
KEYONNA LOYA is a 34y  s/p rCS + BS now POD3 of a viable male infant.     SUBJECTIVE:  No acute events overnight, patient has no complaints.  Desires circ for baby prior to DC.  Pain is well controlled with PRN pain medication.  She is ambulating, voiding, tolerating PO. Denies N/V  +flatus, -BM.  minimal lochia.    OBJECTIVE:  Physical exam:  General: AOx3, NAD.  Abdomen: +BS, Soft, appropriately tender to palpitation, firm uterine fundus at umbilicus. Incision is clean dry and intact.  Ext: No DVT signs, warm extremities.    Vital Signs Last 24 Hrs  T(C): 36.8 (2019 20:57), Max: 36.8 (2019 20:57)  T(F): 98.2 (2019 20:57), Max: 98.2 (2019 20:57)  HR: 68 (2019 20:57) (68 - 68)  BP: 102/67 (2019 20:57) (102/67 - 102/67)  RR: 18 (2019 20:57) (18 - 18)  SpO2: 98% (2019 20:57) (98% - 98%)

## 2019-11-13 LAB — SURGICAL PATHOLOGY STUDY: SIGNIFICANT CHANGE UP

## 2020-09-26 ENCOUNTER — EMERGENCY (EMERGENCY)
Facility: HOSPITAL | Age: 35
LOS: 1 days | Discharge: HOME | End: 2020-09-26
Attending: EMERGENCY MEDICINE
Payer: COMMERCIAL

## 2020-09-26 VITALS
DIASTOLIC BLOOD PRESSURE: 84 MMHG | HEIGHT: 64 IN | SYSTOLIC BLOOD PRESSURE: 139 MMHG | WEIGHT: 209 LBS | HEART RATE: 112 BPM | OXYGEN SATURATION: 99 % | RESPIRATION RATE: 20 BRPM | TEMPERATURE: 98 F

## 2020-09-26 VITALS
DIASTOLIC BLOOD PRESSURE: 94 MMHG | RESPIRATION RATE: 16 BRPM | SYSTOLIC BLOOD PRESSURE: 130 MMHG | HEART RATE: 98 BPM | OXYGEN SATURATION: 98 %

## 2020-09-26 DIAGNOSIS — Z98.84 BARIATRIC SURGERY STATUS: Chronic | ICD-10-CM

## 2020-09-26 DIAGNOSIS — Z90.49 ACQUIRED ABSENCE OF OTHER SPECIFIED PARTS OF DIGESTIVE TRACT: Chronic | ICD-10-CM

## 2020-09-26 PROBLEM — K25.3 ACUTE GASTRIC ULCER WITHOUT HEMORRHAGE OR PERFORATION: Chronic | Status: ACTIVE | Noted: 2019-10-30

## 2020-09-26 LAB
ALBUMIN SERPL ELPH-MCNC: 4.1 G/DL — SIGNIFICANT CHANGE UP (ref 3.3–5)
ALP SERPL-CCNC: 47 U/L — SIGNIFICANT CHANGE UP (ref 40–120)
ALT FLD-CCNC: 9 U/L — LOW (ref 10–45)
ANION GAP SERPL CALC-SCNC: 12 MMOL/L — SIGNIFICANT CHANGE UP (ref 5–17)
ANISOCYTOSIS BLD QL: SIGNIFICANT CHANGE UP
APTT BLD: 24.7 SEC — LOW (ref 27.5–35.5)
AST SERPL-CCNC: 13 U/L — SIGNIFICANT CHANGE UP (ref 10–40)
BASOPHILS # BLD AUTO: 0.12 K/UL — SIGNIFICANT CHANGE UP (ref 0–0.2)
BASOPHILS NFR BLD AUTO: 1.7 % — SIGNIFICANT CHANGE UP (ref 0–2)
BILIRUB SERPL-MCNC: 0.3 MG/DL — SIGNIFICANT CHANGE UP (ref 0.2–1.2)
BUN SERPL-MCNC: 12 MG/DL — SIGNIFICANT CHANGE UP (ref 7–23)
CALCIUM SERPL-MCNC: 9.2 MG/DL — SIGNIFICANT CHANGE UP (ref 8.4–10.5)
CHLORIDE SERPL-SCNC: 106 MMOL/L — SIGNIFICANT CHANGE UP (ref 96–108)
CO2 SERPL-SCNC: 22 MMOL/L — SIGNIFICANT CHANGE UP (ref 22–31)
CREAT SERPL-MCNC: 0.68 MG/DL — SIGNIFICANT CHANGE UP (ref 0.5–1.3)
DACRYOCYTES BLD QL SMEAR: SLIGHT — SIGNIFICANT CHANGE UP
ELLIPTOCYTES BLD QL SMEAR: SLIGHT — SIGNIFICANT CHANGE UP
EOSINOPHIL # BLD AUTO: 0.12 K/UL — SIGNIFICANT CHANGE UP (ref 0–0.5)
EOSINOPHIL NFR BLD AUTO: 1.7 % — SIGNIFICANT CHANGE UP (ref 0–6)
GLUCOSE SERPL-MCNC: 119 MG/DL — HIGH (ref 70–99)
HCG SERPL-ACNC: <2 MIU/ML — SIGNIFICANT CHANGE UP
HCT VFR BLD CALC: 30.9 % — LOW (ref 34.5–45)
HGB BLD-MCNC: 8.9 G/DL — LOW (ref 11.5–15.5)
HYPOCHROMIA BLD QL: SLIGHT — SIGNIFICANT CHANGE UP
INR BLD: 0.99 RATIO — SIGNIFICANT CHANGE UP (ref 0.88–1.16)
LYMPHOCYTES # BLD AUTO: 1.54 K/UL — SIGNIFICANT CHANGE UP (ref 1–3.3)
LYMPHOCYTES # BLD AUTO: 21.8 % — SIGNIFICANT CHANGE UP (ref 13–44)
MANUAL SMEAR VERIFICATION: SIGNIFICANT CHANGE UP
MCHC RBC-ENTMCNC: 20.4 PG — LOW (ref 27–34)
MCHC RBC-ENTMCNC: 28.8 GM/DL — LOW (ref 32–36)
MCV RBC AUTO: 70.7 FL — LOW (ref 80–100)
MICROCYTES BLD QL: SIGNIFICANT CHANGE UP
MONOCYTES # BLD AUTO: 0.25 K/UL — SIGNIFICANT CHANGE UP (ref 0–0.9)
MONOCYTES NFR BLD AUTO: 3.5 % — SIGNIFICANT CHANGE UP (ref 2–14)
NEUTROPHILS # BLD AUTO: 5.05 K/UL — SIGNIFICANT CHANGE UP (ref 1.8–7.4)
NEUTROPHILS NFR BLD AUTO: 71.3 % — SIGNIFICANT CHANGE UP (ref 43–77)
OVALOCYTES BLD QL SMEAR: SLIGHT — SIGNIFICANT CHANGE UP
PLAT MORPH BLD: NORMAL — SIGNIFICANT CHANGE UP
PLATELET # BLD AUTO: 283 K/UL — SIGNIFICANT CHANGE UP (ref 150–400)
POIKILOCYTOSIS BLD QL AUTO: SLIGHT — SIGNIFICANT CHANGE UP
POLYCHROMASIA BLD QL SMEAR: SLIGHT — SIGNIFICANT CHANGE UP
POTASSIUM SERPL-MCNC: 3.5 MMOL/L — SIGNIFICANT CHANGE UP (ref 3.5–5.3)
POTASSIUM SERPL-SCNC: 3.5 MMOL/L — SIGNIFICANT CHANGE UP (ref 3.5–5.3)
PROT SERPL-MCNC: 6.9 G/DL — SIGNIFICANT CHANGE UP (ref 6–8.3)
PROTHROM AB SERPL-ACNC: 11.8 SEC — SIGNIFICANT CHANGE UP (ref 10.6–13.6)
RBC # BLD: 4.37 M/UL — SIGNIFICANT CHANGE UP (ref 3.8–5.2)
RBC # FLD: 18.8 % — HIGH (ref 10.3–14.5)
RBC BLD AUTO: ABNORMAL
SCHISTOCYTES BLD QL AUTO: SLIGHT — SIGNIFICANT CHANGE UP
SODIUM SERPL-SCNC: 140 MMOL/L — SIGNIFICANT CHANGE UP (ref 135–145)
TARGETS BLD QL SMEAR: SLIGHT — SIGNIFICANT CHANGE UP
WBC # BLD: 7.08 K/UL — SIGNIFICANT CHANGE UP (ref 3.8–10.5)
WBC # FLD AUTO: 7.08 K/UL — SIGNIFICANT CHANGE UP (ref 3.8–10.5)

## 2020-09-26 PROCEDURE — 73610 X-RAY EXAM OF ANKLE: CPT | Mod: 26,RT

## 2020-09-26 PROCEDURE — 99053 MED SERV 10PM-8AM 24 HR FAC: CPT

## 2020-09-26 PROCEDURE — 99284 EMERGENCY DEPT VISIT MOD MDM: CPT | Mod: 25

## 2020-09-26 PROCEDURE — 72125 CT NECK SPINE W/O DYE: CPT | Mod: 26

## 2020-09-26 PROCEDURE — 70450 CT HEAD/BRAIN W/O DYE: CPT | Mod: 26

## 2020-09-26 PROCEDURE — 73610 X-RAY EXAM OF ANKLE: CPT

## 2020-09-26 PROCEDURE — 85025 COMPLETE CBC W/AUTO DIFF WBC: CPT

## 2020-09-26 PROCEDURE — 73120 X-RAY EXAM OF HAND: CPT

## 2020-09-26 PROCEDURE — 73070 X-RAY EXAM OF ELBOW: CPT | Mod: 26,50

## 2020-09-26 PROCEDURE — 80053 COMPREHEN METABOLIC PANEL: CPT

## 2020-09-26 PROCEDURE — 72125 CT NECK SPINE W/O DYE: CPT

## 2020-09-26 PROCEDURE — 74177 CT ABD & PELVIS W/CONTRAST: CPT

## 2020-09-26 PROCEDURE — 70450 CT HEAD/BRAIN W/O DYE: CPT

## 2020-09-26 PROCEDURE — 73120 X-RAY EXAM OF HAND: CPT | Mod: 26,RT

## 2020-09-26 PROCEDURE — 71260 CT THORAX DX C+: CPT | Mod: 26

## 2020-09-26 PROCEDURE — 85730 THROMBOPLASTIN TIME PARTIAL: CPT

## 2020-09-26 PROCEDURE — 85610 PROTHROMBIN TIME: CPT

## 2020-09-26 PROCEDURE — 99285 EMERGENCY DEPT VISIT HI MDM: CPT

## 2020-09-26 PROCEDURE — 71260 CT THORAX DX C+: CPT

## 2020-09-26 PROCEDURE — 74177 CT ABD & PELVIS W/CONTRAST: CPT | Mod: 26

## 2020-09-26 PROCEDURE — 84702 CHORIONIC GONADOTROPIN TEST: CPT

## 2020-09-26 PROCEDURE — 73070 X-RAY EXAM OF ELBOW: CPT

## 2020-09-26 RX ORDER — SODIUM CHLORIDE 9 MG/ML
1000 INJECTION INTRAMUSCULAR; INTRAVENOUS; SUBCUTANEOUS ONCE
Refills: 0 | Status: COMPLETED | OUTPATIENT
Start: 2020-09-26 | End: 2020-09-26

## 2020-09-26 RX ORDER — ACETAMINOPHEN 500 MG
650 TABLET ORAL ONCE
Refills: 0 | Status: COMPLETED | OUTPATIENT
Start: 2020-09-26 | End: 2020-09-26

## 2020-09-26 RX ADMIN — Medication 650 MILLIGRAM(S): at 03:15

## 2020-09-26 RX ADMIN — SODIUM CHLORIDE 1000 MILLILITER(S): 9 INJECTION INTRAMUSCULAR; INTRAVENOUS; SUBCUTANEOUS at 03:16

## 2020-09-26 NOTE — ED PROVIDER NOTE - NS ED ROS FT
Gen: Denies fever, weight loss, + HA + fatigue   CV: Denies CP or palpitations   Skin: + rash + erythema + abrasions + ecchymoses  Resp: Denies SOB, cough  Endo: Denies sensitivity to heat, cold, increased urination  GI: Denies constipation, nausea, vomiting + abdominal pain   Msk: Denies back pain, neck pain, LE swelling, + extremity pain  : Denies dysuria, increased frequency  Neuro: Denies LOC, weakness, seizures  Psych: Denies hx of psych, substance abuse
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
## 2020-09-26 NOTE — ED PROVIDER NOTE - PHYSICAL EXAMINATION
General: Well appearing, awake, alert, oriented, no acute distress. Resting in bed, tachycardic to 115  HEENT: PERRLA EOMI. No trauma/bruising noted to head or face, small dried blood to nares. No tenderness to palpation to bony structures of face. No raccoon eyes noted.   CV: Tachycardic, no murmurs/rubs/gallops noted on exam. No tenderness to palpation to chest wall, seatbelt impression noted over chest wall w/o overlying bony step off.   Lungs: Clear to ascultation bilaterally, no wheezes/crackles/rales noted on exam. Equal chest wall excursion noted.   Abdomen: Soft, non tender, non distended, no guarding or rebound. No CVA tenderness to palpation.   MSK: Intact ROM of upper and lower extremities bilaterally Intact ROM to all extremities w/ tenderness to R ankle, b/l elbows, R wrist.  Intact ROM of neck. No gross deformities noted to extremities. No C-spine or spinal bony tenderness to palpation. Pelvis stable.   Neuro: Awake, A+O x4, moving all extremities spontaneously. CN 2-12 grossly intact. Strength and sensation grossly intact to all extremities. Ambulatory w/o assist, normal gait. Speech fluent.   Extremities: No swelling or edema noted to extremities. No calf tenderness to palpation.   Skin: Various scattered abrasions noted over ankle, b/l elbows rashing noted over lower abdomen upper chest.

## 2020-09-26 NOTE — ED ADULT TRIAGE NOTE - ESI TRIAGE ACUITY LEVEL, MLM
3 Pt with uti, well appearing, afebrile, no leukocytosis, not on any Abx, and appropriate for PO tx.   pt disimpacted and tolerated well. Pneumobilia to be expected given recent surgery. Shared with pt the results of the cystic adnexal mass, gave CT results, and advised to f/u with gyn for monitoring

## 2020-09-26 NOTE — ED ADULT NURSE NOTE - NSIMPLEMENTINTERV_GEN_ALL_ED
Implemented All Universal Safety Interventions:  [place] to call system. Call bell, personal items and telephone within reach. Instruct patient to call for assistance. Room bathroom lighting operational. Non-slip footwear when patient is off stretcher. Physically safe environment: no spills, clutter or unnecessary equipment. Stretcher in lowest position, wheels locked, appropriate side rails in place.
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
## 2020-09-26 NOTE — ED ADULT NURSE REASSESSMENT NOTE - NS ED NURSE REASSESS COMMENT FT1
Pt discharged by ED resident Jef. IV access discontinued by ED resident and education provided.
Received report from [facility] RN. Pt at CT scan.
Pt returned from CT scan. Pt reports that pain is better after receiving PO Tylenol. Pt denies any further medications for pain. Pt requesting water, but educated on NPO status while awaiting radiology results. Pt placed in position of comfort. Pt educated on call bell system and provided call bell. Bed in lowest position, wheels locked, appropriate side rails raised. Pt denies needs at this time.
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
## 2020-09-26 NOTE — ED PROVIDER NOTE - PATIENT PORTAL LINK FT
You can access the FollowMyHealth Patient Portal offered by [facility] by registering at the following website: [email or web address]. By joining [employer]’s FollowMyHealth portal, you will also be able to view your health information using other applications (apps) compatible with our system.

## 2020-09-26 NOTE — ED PROVIDER NOTE - PROGRESS NOTE DETAILS
caity: pt reassessed, ready to go home. negative scans. will be picked up by family. Return precuations given.  Pt was re-evaluated at bedside, VSS, feeling better overall. Results were discussed with patient as well as return precautions and follow up plan with PCP and/or specialist. Time was taken to answer any questions that the patient had before providing them with discharge paperwork.
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
## 2020-12-07 ENCOUNTER — TRANSCRIPTION ENCOUNTER (OUTPATIENT)
Age: 35
End: 2020-12-07

## 2021-03-06 ENCOUNTER — TRANSCRIPTION ENCOUNTER (OUTPATIENT)
Age: 36
End: 2021-03-06
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
## 2021-07-23 NOTE — OB PST NOTE - NSPRENATALCARE_OBGYN_ALL_OB
aspirin 81 mg oral delayed release tablet: 1 tab(s) orally once a day  atorvastatin 80 mg oral tablet: 1 tab(s) orally once a day (at bedtime)  clopidogrel 75 mg oral tablet: 1 tab(s) orally once a day   Yes

## 2021-12-16 ENCOUNTER — APPOINTMENT (OUTPATIENT)
Dept: ENDOCRINOLOGY | Facility: CLINIC | Age: 36
End: 2021-12-16
Payer: MEDICAID

## 2021-12-16 VITALS
DIASTOLIC BLOOD PRESSURE: 80 MMHG | BODY MASS INDEX: 40.29 KG/M2 | TEMPERATURE: 97.9 F | HEIGHT: 64 IN | SYSTOLIC BLOOD PRESSURE: 110 MMHG | HEART RATE: 62 BPM | RESPIRATION RATE: 16 BRPM | WEIGHT: 236 LBS

## 2021-12-16 DIAGNOSIS — E16.2 HYPOGLYCEMIA, UNSPECIFIED: ICD-10-CM

## 2021-12-16 DIAGNOSIS — Z78.9 OTHER SPECIFIED HEALTH STATUS: ICD-10-CM

## 2021-12-16 DIAGNOSIS — E61.1 IRON DEFICIENCY: ICD-10-CM

## 2021-12-16 PROCEDURE — 99204 OFFICE O/P NEW MOD 45 MIN: CPT

## 2021-12-16 RX ORDER — PANTOPRAZOLE SODIUM 20 MG/1
TABLET, DELAYED RELEASE ORAL
Refills: 0 | Status: ACTIVE | COMMUNITY

## 2022-03-24 ENCOUNTER — APPOINTMENT (OUTPATIENT)
Dept: ENDOCRINOLOGY | Facility: CLINIC | Age: 37
End: 2022-03-24

## 2024-07-24 NOTE — OB PROVIDER H&P - NSPPHNORISK_OBGYN_ALL_OB
no rashes , no suspicious lesions , no areas of discoloration In my judgment no risk for PPH has been identified at this time.

## 2024-10-08 NOTE — ED ADULT NURSE NOTE - DISCHARGE DATE/TIME
HPI:  Raven Smith is a 26 year old G0 who presents for annual exam.     Doing well. Would like to come off birth control completely.  Has been on Kelnor (1/35) for the past 9 years. Periods are regular and monthly on the birth control. Feels like it has been effecting her moods. Would like to track cycles and use natural family planning for birth control.  since last August to  who is in the . No problems with intercourse until moving to [place] and is now having some irritation afterwards, mainly with urination. Will then go away over the next day. No recent changes in soaps or detergents.     Also interested in checking thyroid because she has felt very fatigued lately and has been gaining weight.     Periods prior to birth control were regular but heavy with severe cramps    Pap hx: none  Sexual activity: yes, 1 male partners, no new partners in last year    PMH: none  PSH: none  Fam hx: no hx colon, uterine, ovarian, cervical, or breast CA    Social hx: minimal alcohol, no smoking     Problem List:  There is no problem list on file for this patient.      Past Medical History:  History reviewed. No pertinent past medical history.    Past Surgical History: No past surgical history on file.    Family History: No family history on file.    Social History:      Obstetric History:   OB History   No obstetric history on file.         Allergies:  Allergies as of 10/08/2024    (No Known Allergies)       Medications:  No current outpatient medications on file.     No current facility-administered medications for this visit.       Review of Systems:  Negative per HPI      Physical Exam:  Vitals:    10/08/24 1414   Weight: 73.5 kg (162 lb)   Height: 5' 6.5\" (1.689 m)   LMP: 09/30/2024     Body mass index is 25.76 kg/m².    Gen: NAD  CV: RR  Lungs: Normal resp effort   Abdomen:  soft. nontender to palpation  Breast: Symmetric appearance without lesions or rashes. No tenderness with palpation. No  masses or lumps. No spontaneous or elicited nipple discharge. No axillary lymphadenopathy.   SSE: normal external genitalia without rashes or lesions. Vaginal mucosa appropriately pink and moist. scant vaginal discharge, cream in color in vaginal vault. Cervix visually nulliparous and closed  Bimanual: Uterus appropriately sized in anteverted position. No CMT. No adnexal tenderness or fullness.   Extremities: NT/NE      Impression/Plan:  Raven Smith is a 26 year old G0 who presents for annual visit. Overall doing well.     Health Maintenance:   - No Pap Hx, collected today    BC:   We discussed stopping COCs and using cycle tracking/ovulation tests. Recommend using condoms during the first few months when learning cycle off birth control. We did discuss lower efficacy of natural family method compared to other methods    Vaginal irritation  - discussed STI screening: GCCT/trich. HIV, HPV, HepC/B; myoplasma and ureaplasma per request  - discussed PFPT--she is interested  - discussed other supportive cares    Thyroid for fatigue/weight gain     RTC in 1 year or sooner CHELO Arredondo MD     26-Sep-2020 06:10

## 2025-04-21 NOTE — OB RN PATIENT PROFILE - PRO PATERNAL INTER NEED
Detail Level: Simple Plan: Stop spironolactone as pt is getting  and may be planning a pregnancy. \\nDiscussed Vit D supplementation and then recheck in 3 m\\nCan start Nutrafol for women if desired.\\nContinue topical minoxidil. \\nPt wants to stop spironolactone - agree with this since she is getting  and possibly planning a pregnancy English